# Patient Record
Sex: MALE | Race: WHITE
[De-identification: names, ages, dates, MRNs, and addresses within clinical notes are randomized per-mention and may not be internally consistent; named-entity substitution may affect disease eponyms.]

---

## 2019-01-26 ENCOUNTER — HOSPITAL ENCOUNTER (EMERGENCY)
Dept: HOSPITAL 89 - ER | Age: 29
Discharge: HOME | End: 2019-01-26
Payer: OTHER GOVERNMENT

## 2019-01-26 VITALS — DIASTOLIC BLOOD PRESSURE: 78 MMHG | SYSTOLIC BLOOD PRESSURE: 123 MMHG

## 2019-01-26 DIAGNOSIS — S43.101A: Primary | ICD-10-CM
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
## 2019-01-26 PROCEDURE — 99283 EMERGENCY DEPT VISIT LOW MDM: CPT

## 2019-01-26 PROCEDURE — 73030 X-RAY EXAM OF SHOULDER: CPT

## 2019-01-26 NOTE — ER REPORT
History and Physical


Time Seen By MD:  15:37


HPI/ROS


CHIEF COMPLAINT: Right shoulder pain





HISTORY OF PRESENT ILLNESS: This is a 28-year-old male who presents to the 


emergency department for right shoulder pain. Patient states that earlier today 


[place] cutting toe side edge fell forward


landing face 1st into this no, denies loss of consciousness, had significant 


right shoulder pain,  did car and down, subsequently sent to the ER 


for further evaluation. Patient has a mild deformity of the right shoulder, I am


concerned about an A/C separation. No fevers, chills. No chest pain or shortness


of breath. No C-spine tenderness.





REVIEW OF SYSTEMS:


Respiratory: No cough, no dyspnea.


Cardiovascular: No chest pain, no palpitations.


Gastrointestinal: No vomiting, no abdominal pain.


Musculoskeletal: As above.


Allergies:  


Coded Allergies:  


     cinnamon (Verified  Allergy, Severe, HIVES, 19)


   


   IN MOUTH


Home Meds


Active Scripts


Hydrocodone Bit/Acetaminophen (HYDROCODON-ACETAMINOPHEN 5-325) 1 Each Tablet, 1 


EACH PO Q4-6H PRN for PAIN, #8 TAB


   Prov:HEYDI STOREY FNP-BC         19


Past Medical/Surgical History


The patient has a past medical and surgical history of some teeth extraction, 


broken fingers.


Reviewed Nurses Notes:  Yes


Constitutional





Vital Sign - Last 24 Hours








 19





 15:29 15:37 15:53 16:00


 


Temp  98.7  


 


Pulse  94 87 


 


Resp  14  


 


B/P (MAP) 124/76 (92) 124/76  120/73 (89)


 


Pulse Ox  90 94 


 


O2 Delivery  Room Air Room Air 


 


    





 19





 16:23 16:30 16:35 17:00


 


Pulse 88  90 


 


B/P (MAP)  124/79 (94)  118/69 (85)


 


Pulse Ox 94  88 


 


O2 Delivery Room Air   





 19  





 17:05 17:25  


 


Pulse 91   


 


B/P (MAP)  123/78 (93)  


 


Pulse Ox 87   








Physical Exam


  General Appearance: The patient is alert, has no immediate need for airway 


protection and no current signs of toxicity.


Eyes: Pupils equal and round no injection.


Respiratory: Chest is non tender, lungs are clear to auscultation.


Cardiac: regular rate and rhythm.


Gastrointestinal: Abdomen is soft and non tender, no masses, bowel sounds 


normal.


Musculoskeletal:  Neck: Neck is supple and non tender.


   Extremities mild deformity of the right shoulder along the acromioclavicular 


joint, no deformities of the clavicle, no pain to the humerus. No crepitus.


Skin: No rashes or lesions.


[ ]


DIFFERENTIAL DIAGNOSIS: After history and physical exam differential diagnosis 


was considered for contusion, abrasion, fracture, dislocation, acromioclavicular


separation.





Medical Decision Making


EKG/Imaging


Imaging


Location: [facility] [place]


Patient: [place], [place]


: 1990


MRN: [id number]


Visit/Account:[id number]


Date of Sevice:  2019


 


ACCESSION #: [id number]


 


SHOULDER MIN 2 VIEWS RIGHT


 


HISTORY: fall, pain, deformity


 


COMPARISON: None


 


FINDINGS:


 


Right shoulder: Grade 3 AC joint separation.  Glenohumeral joint is normal.  No 


acute fracture.


 


IMPRESSION:


 


1.  Grade 3 right AC joint separation.


 


Report Dictated By: Tony Dhaliwal MD at 2019 4:39 PM


 


Report E-Signed By: Tony Dhaliwal MD  at 2019 4:41 PM


 


WSN:[email or web address]





ED Course/Re-evaluation


ED Course


The patient was admitted to room. A history and physical were obtained. 


Differential diagnoses were considered. An x-ray of the right shoulder showing a


grade 3 acromioclavicular separation. I discussed this with the patient. He was 


given 1 hydrocodone, 800 mg of ibuprofen, placed in a sling. CMS intact after 


the application of the sling. Also encouraged the patient to follow-up with the 


orthopedist of his choice when he returns to [place]. Patient was given pain 


medications as well. Patient had no other questions or concerns at this time and


was discharged home. Patient expressed understanding and was in agreement with 


this plan of care.


Decision to Disposition Date:  2019


Decision to Disposition Time:  17:26





Depart


Departure


Latest Vital Signs





Vital Signs








  Date Time  Temp Pulse Resp B/P (MAP) Pulse Ox O2 Delivery O2 Flow Rate FiO2


 


19 17:25    123/78 (93)    


 


1/26/19 17:05  91   87   


 


19 16:23      Room Air  


 


19 15:37 98.7  14     








Impression:  


   Primary Impression:  


   AC separation, type 3


Condition:  Improved


Disposition:  HOME OR SELF-CARE


New Scripts


Hydrocodone Bit/Acetaminophen (HYDROCODON-ACETAMINOPHEN 5-325) 1 Each Tablet


1 EACH PO Q4-6H PRN for PAIN, #8 TAB


   Prov: HEYDI STOREY         19


Patient Instructions:  Acromioclavicular Separation (ED)





Additional Instructions:  


Please keep the sling on until you follow up with Ortho.


Please contact orthopedics in [place] for follow up.


Take 600-800mg Ibuprofen as needed  for pain.


Take the norco for severe pain.


drink plenty of water.


Get plenty of rest.


Return to the ED for any other concerns or worsening symptoms.





Problem Qualifiers








   Primary Impression:  


   AC separation, type 3


   Encounter type:  initial encounter  Laterality:  right  Qualified Codes:  


   S43.101A - Unspecified dislocation of right acromioclavicular joint, initial 


   encounter








HEYDI STOREY-BC      2019 15:37

## 2019-01-26 NOTE — RADIOLOGY IMAGING REPORT
FACILITY: [facility] [place] 

 

PATIENT NAME: [place] Byers

: 1990

MR: [id number]

V: [id number]

EXAM DATE: 

ORDERING PHYSICIAN: HEYDI STOREY

TECHNOLOGIST: 

 

Location: [facility]

Patient: Byers, [place]

: 1990

MRN: [id number]

Visit/Account:[id number]

Date of Sevice:  2019

 

ACCESSION #: [id number]

 

SHOULDER MIN 2 VIEWS RIGHT

 

HISTORY: fall, pain, deformity

 

COMPARISON: None

 

FINDINGS:

 

Right shoulder: Grade 3 AC joint separation.  Glenohumeral joint is normal.  No acute fracture.

 

IMPRESSION:

 

1.  Grade 3 right AC joint separation.

 

Report Dictated By: Tony Dhaliwal MD at 2019 4:39 PM

 

Report E-Signed By: Tony Dhaliwal MD  at 2019 4:41 PM

 

WSN:[id number]